# Patient Record
Sex: FEMALE | Race: WHITE | NOT HISPANIC OR LATINO | Employment: STUDENT | URBAN - METROPOLITAN AREA
[De-identification: names, ages, dates, MRNs, and addresses within clinical notes are randomized per-mention and may not be internally consistent; named-entity substitution may affect disease eponyms.]

---

## 2019-07-24 ENCOUNTER — OFFICE VISIT (OUTPATIENT)
Dept: FAMILY MEDICINE CLINIC | Facility: CLINIC | Age: 19
End: 2019-07-24

## 2019-07-24 VITALS
HEIGHT: 64 IN | OXYGEN SATURATION: 100 % | BODY MASS INDEX: 18.74 KG/M2 | WEIGHT: 109.8 LBS | TEMPERATURE: 98.1 F | DIASTOLIC BLOOD PRESSURE: 69 MMHG | SYSTOLIC BLOOD PRESSURE: 102 MMHG | HEART RATE: 50 BPM

## 2019-07-24 DIAGNOSIS — J30.2 SEASONAL ALLERGIC RHINITIS, UNSPECIFIED TRIGGER: Primary | ICD-10-CM

## 2019-07-24 DIAGNOSIS — R63.4 WEIGHT LOSS: ICD-10-CM

## 2019-07-24 DIAGNOSIS — F40.10 SOCIAL ANXIETY DISORDER: ICD-10-CM

## 2019-07-24 PROBLEM — F32.A DEPRESSION: Status: ACTIVE | Noted: 2019-07-24

## 2019-07-24 PROBLEM — R53.83 OTHER FATIGUE: Status: ACTIVE | Noted: 2019-07-24

## 2019-07-24 PROBLEM — J30.9 ALLERGIC RHINITIS: Status: ACTIVE | Noted: 2019-07-24

## 2019-07-24 PROBLEM — Z00.00 ENCOUNTER FOR PREVENTIVE HEALTH EXAMINATION: Status: ACTIVE | Noted: 2019-07-24

## 2019-07-24 PROBLEM — R60.1 GENERALIZED EDEMA: Status: ACTIVE | Noted: 2019-07-24

## 2019-07-24 PROBLEM — R22.0 FACIAL SWELLING: Status: ACTIVE | Noted: 2019-07-24

## 2019-07-24 PROCEDURE — 99214 OFFICE O/P EST MOD 30 MIN: CPT | Performed by: FAMILY MEDICINE

## 2019-07-24 RX ORDER — DULOXETIN HYDROCHLORIDE 60 MG/1
60 CAPSULE, DELAYED RELEASE ORAL DAILY
COMMUNITY
End: 2019-07-24

## 2019-07-24 RX ORDER — CETIRIZINE HYDROCHLORIDE 10 MG/1
10 TABLET ORAL DAILY
COMMUNITY

## 2019-07-24 RX ORDER — FLUTICASONE PROPIONATE 50 MCG
2 SPRAY, SUSPENSION (ML) NASAL DAILY
COMMUNITY

## 2019-07-24 NOTE — PROGRESS NOTES
Chief Complaint   Patient presents with   • Allergic Rhinitis   • Diabetes       Subjective   Kelley Singh is a 18 y.o. female.     History of Present Illness     F/u AR. Doing well with meds.  No SE.    F/U social anxiety D/O .  Doing well with anxiety.  I am running 4-10 miles a day 5 days a week.  Per patient,  Weight loss with orange theory and endurance training.  Concern on labs, per mom.  Has had a slow gradual wt loss per patient.         The following portions of the patient's history were reviewed and updated as appropriate: allergies, current medications, past family history, past medical history, past social history, past surgical history and problem list.    Review of Systems   Constitutional: Negative for activity change, appetite change, fatigue and unexpected weight gain.   Respiratory: Negative for cough and shortness of breath.    Cardiovascular: Negative for chest pain and leg swelling.       Patient Active Problem List   Diagnosis   • Allergic rhinitis   • Depression   • Generalized edema   • Facial swelling   • Other fatigue   • Encounter for preventive health examination   • Social anxiety disorder   • Weight loss       Allergies   Allergen Reactions   • Cefzil [Cefprozil] Other (See Comments)     .         Current Outpatient Medications:   •  cetirizine (zyrTEC) 10 MG tablet, Take 10 mg by mouth Daily., Disp: , Rfl:   •  fluticasone (EQL FLUTICASONE CHILDRENS) 50 MCG/ACT nasal spray, 2 sprays into the nostril(s) as directed by provider Daily., Disp: , Rfl:     Past Medical History:   Diagnosis Date   • Allergic        Past Surgical History:   Procedure Laterality Date   • ADENOIDECTOMY     • TONSILLECTOMY         History reviewed. No pertinent family history.    Social History     Tobacco Use   • Smoking status: Never Smoker   Substance Use Topics   • Alcohol use: Yes     Comment: socially             Objective     Vitals:    07/24/19 1454   BP: 102/69   Pulse: 50   Temp: 98.1 °F (36.7  °C)   SpO2: 100%     Body mass index is 18.85 kg/m².    Physical Exam   Constitutional: She appears well-developed and well-nourished.   Cardiovascular: Normal rate, regular rhythm and normal heart sounds. Exam reveals no gallop and no friction rub.   No murmur heard.  Pulmonary/Chest: Effort normal and breath sounds normal. No respiratory distress. She has no wheezes. She has no rales.   Vitals reviewed.      No results found for: GLUCOSE, BUN, CREATININE, EGFRIFNONA, EGFRIFAFRI, BCR, K, CO2, CALCIUM, PROTENTOTREF, ALBUMIN, LABIL2, AST, ALT    No results found for: WBC, RBC, HGB, HCT, MCV, MCH, MCHC, RDW, RDWSD, MPV, PLT, NEUTRORELPCT, LYMPHORELPCT, MONORELPCT, EOSRELPCT, BASORELPCT, AUTOIGPER, NEUTROABS, LYMPHSABS, MONOSABS, EOSABS, BASOSABS, AUTOIGNUM, NRBC    No results found for: HGBA1C    No results found for: [id number]    No results found for: TSH    No results found for: CHOL  No results found for: TRIG  No results found for: HDL  No results found for: LDL  No results found for: VLDL  No results found for: LDLHDL      Procedures    Assessment/Plan   Problems Addressed this Visit        Respiratory    Allergic rhinitis - Primary       Other    Social anxiety disorder    Weight loss    Relevant Orders    CBC & Differential    Comprehensive Metabolic Panel    TSH Rfx On Abnormal To Free T4          Orders Placed This Encounter   Procedures   • Comprehensive Metabolic Panel   • TSH Rfx On Abnormal To Free T4   • CBC & Differential     Order Specific Question:   Manual Differential     Answer:   No       Current Outpatient Medications   Medication Sig Dispense Refill   • cetirizine (zyrTEC) 10 MG tablet Take 10 mg by mouth Daily.     • fluticasone (EQL FLUTICASONE CHILDRENS) 50 MCG/ACT nasal spray 2 sprays into the nostril(s) as directed by provider Daily.       No current facility-administered medications for this visit.        Kelley Singh had no medications administered during this visit.    Return in about  1 year (around 7/24/2020).    There are no Patient Instructions on file for this visit.

## 2019-07-25 PROBLEM — D50.8 IRON DEFICIENCY ANEMIA SECONDARY TO INADEQUATE DIETARY IRON INTAKE: Status: ACTIVE | Noted: 2019-07-25

## 2019-07-25 LAB
ALBUMIN SERPL-MCNC: 5.5 G/DL (ref 3.5–5.2)
ALBUMIN/GLOB SERPL: 3.1 G/DL
ALP SERPL-CCNC: 45 U/L (ref 39–117)
ALT SERPL-CCNC: 23 U/L (ref 1–33)
AST SERPL-CCNC: 18 U/L (ref 1–32)
BASOPHILS # BLD AUTO: 0.03 10*3/MM3 (ref 0–0.2)
BASOPHILS NFR BLD AUTO: 0.5 % (ref 0–1.5)
BILIRUB SERPL-MCNC: 0.3 MG/DL (ref 0.2–1.2)
BUN SERPL-MCNC: 11 MG/DL (ref 6–20)
BUN/CREAT SERPL: 12.1 (ref 7–25)
CALCIUM SERPL-MCNC: 10.1 MG/DL (ref 8.6–10.5)
CHLORIDE SERPL-SCNC: 99 MMOL/L (ref 98–107)
CO2 SERPL-SCNC: 28.4 MMOL/L (ref 22–29)
CREAT SERPL-MCNC: 0.91 MG/DL (ref 0.57–1)
EOSINOPHIL # BLD AUTO: 0.03 10*3/MM3 (ref 0–0.4)
EOSINOPHIL NFR BLD AUTO: 0.5 % (ref 0.3–6.2)
ERYTHROCYTE [DISTWIDTH] IN BLOOD BY AUTOMATED COUNT: 13.6 % (ref 12.3–15.4)
GLOBULIN SER CALC-MCNC: 1.8 GM/DL
GLUCOSE SERPL-MCNC: 67 MG/DL (ref 65–99)
HCT VFR BLD AUTO: 33.7 % (ref 34–46.6)
HGB BLD-MCNC: 10.5 G/DL (ref 12–15.9)
IMM GRANULOCYTES # BLD AUTO: 0.01 10*3/MM3 (ref 0–0.05)
IMM GRANULOCYTES NFR BLD AUTO: 0.2 % (ref 0–0.5)
LYMPHOCYTES # BLD AUTO: 3.02 10*3/MM3 (ref 0.7–3.1)
LYMPHOCYTES NFR BLD AUTO: 49.2 % (ref 19.6–45.3)
MCH RBC QN AUTO: 31.1 PG (ref 26.6–33)
MCHC RBC AUTO-ENTMCNC: 31.2 G/DL (ref 31.5–35.7)
MCV RBC AUTO: 99.7 FL (ref 79–97)
MONOCYTES # BLD AUTO: 0.37 10*3/MM3 (ref 0.1–0.9)
MONOCYTES NFR BLD AUTO: 6 % (ref 5–12)
NEUTROPHILS # BLD AUTO: 2.68 10*3/MM3 (ref 1.7–7)
NEUTROPHILS NFR BLD AUTO: 43.6 % (ref 42.7–76)
NRBC BLD AUTO-RTO: 0 /100 WBC (ref 0–0.2)
PLATELET # BLD AUTO: 209 10*3/MM3 (ref 140–450)
POTASSIUM SERPL-SCNC: 4.7 MMOL/L (ref 3.5–5.2)
PROT SERPL-MCNC: 7.3 G/DL (ref 6–8.5)
RBC # BLD AUTO: 3.38 10*6/MM3 (ref 3.77–5.28)
SODIUM SERPL-SCNC: 140 MMOL/L (ref 136–145)
TSH SERPL DL<=0.005 MIU/L-ACNC: 1.61 MIU/ML (ref 0.27–4.2)
WBC # BLD AUTO: 6.14 10*3/MM3 (ref 3.4–10.8)

## 2019-07-29 ENCOUNTER — TELEPHONE (OUTPATIENT)
Dept: FAMILY MEDICINE CLINIC | Facility: CLINIC | Age: 19
End: 2019-07-29

## 2019-07-29 NOTE — TELEPHONE ENCOUNTER
Pt called and stated that her dermatologist wants to start her on spironolactone 10mg for cystic acne, her mother wants to make sure this is okay for her to take and wont cause her to lose weight etc. Please advise

## 2019-11-27 ENCOUNTER — OFFICE VISIT (OUTPATIENT)
Dept: FAMILY MEDICINE CLINIC | Facility: CLINIC | Age: 19
End: 2019-11-27

## 2019-11-27 VITALS
OXYGEN SATURATION: 95 % | TEMPERATURE: 98 F | DIASTOLIC BLOOD PRESSURE: 68 MMHG | HEIGHT: 64 IN | WEIGHT: 132.8 LBS | HEART RATE: 87 BPM | SYSTOLIC BLOOD PRESSURE: 105 MMHG | BODY MASS INDEX: 22.67 KG/M2

## 2019-11-27 DIAGNOSIS — D50.8 IRON DEFICIENCY ANEMIA SECONDARY TO INADEQUATE DIETARY IRON INTAKE: ICD-10-CM

## 2019-11-27 DIAGNOSIS — K59.1 FUNCTIONAL DIARRHEA: Primary | ICD-10-CM

## 2019-11-27 DIAGNOSIS — R14.0 ABDOMINAL DISTENTION: ICD-10-CM

## 2019-11-27 DIAGNOSIS — J30.2 SEASONAL ALLERGIC RHINITIS, UNSPECIFIED TRIGGER: ICD-10-CM

## 2019-11-27 PROCEDURE — 99214 OFFICE O/P EST MOD 30 MIN: CPT | Performed by: FAMILY MEDICINE

## 2019-11-27 RX ORDER — SPIRONOLACTONE 100 MG/1
100 TABLET, FILM COATED ORAL DAILY
Refills: 10 | COMMUNITY
Start: 2019-10-28

## 2019-11-27 NOTE — PROGRESS NOTES
Chief Complaint   Patient presents with   • Allergies       Subjective   Kelley Singh is a 19 y.o. female.     History of Present Illness   FU AR.   Doing well with meds.     F/U social anxiety d/o.  I am doing well with my regular exercise.  I am eating more now.  Doing orange theory now.    C/o bloating with eating.  Even with a regular meal or a snack. Often trouble with diarrhea.  No constipation.  I have every day. No better or worse.   New problem.  NO help with food elimination.      F/U iron def anemia.  Hemoglobin 10.5 4 months ago. On flintstones vitamin one a day.  I do not have periods due to having an IUD.      The following portions of the patient's history were reviewed and updated as appropriate: allergies, current medications, past family history, past medical history, past social history, past surgical history and problem list.    Review of Systems   Constitutional: Negative for appetite change and fatigue.   HENT: Negative for nosebleeds and sore throat.    Eyes: Negative for blurred vision and visual disturbance.   Respiratory: Negative for shortness of breath and wheezing.    Cardiovascular: Negative for chest pain and leg swelling.   Gastrointestinal: Positive for abdominal pain and diarrhea. Negative for abdominal distention and vomiting.   Endocrine: Negative for cold intolerance and polyuria.   Genitourinary: Negative for dysuria and hematuria.   Musculoskeletal: Negative for arthralgias and myalgias.   Skin: Negative for color change and rash.   Neurological: Negative for weakness and confusion.   Psychiatric/Behavioral: Negative for agitation and depressed mood.       Patient Active Problem List   Diagnosis   • Allergic rhinitis   • Depression   • Generalized edema   • Facial swelling   • Other fatigue   • Encounter for preventive health examination   • Social anxiety disorder   • Weight loss   • Iron deficiency anemia secondary to inadequate dietary iron intake   • Functional  diarrhea   • Abdominal distention       Allergies   Allergen Reactions   • Cefzil [Cefprozil] Other (See Comments)     .         Current Outpatient Medications:   •  cetirizine (zyrTEC) 10 MG tablet, Take 10 mg by mouth Daily., Disp: , Rfl:   •  fluticasone (EQL FLUTICASONE CHILDRENS) 50 MCG/ACT nasal spray, 2 sprays into the nostril(s) as directed by provider Daily., Disp: , Rfl:   •  spironolactone (ALDACTONE) 100 MG tablet, Take 100 mg by mouth Daily., Disp: , Rfl: 10    Past Medical History:   Diagnosis Date   • Allergic    • Allergic rhinitis    • Depression    • Edema    • Encounter for preventive health examination    • Facial swelling    • Fatigue        Past Surgical History:   Procedure Laterality Date   • ADENOIDECTOMY     • TONSILLECTOMY         Family History   Family history unknown: Yes       Social History     Tobacco Use   • Smoking status: Never Smoker   • Smokeless tobacco: Never Used   Substance Use Topics   • Alcohol use: Yes     Comment: socially             Objective     Vitals:    11/27/19 0911   BP: 105/68   Pulse: 87   Temp: 98 °F (36.7 °C)   SpO2: 95%     Body mass index is 22.8 kg/m².    Physical Exam   Constitutional: She is oriented to person, place, and time. She appears well-developed and well-nourished.   HENT:   Head: Normocephalic and atraumatic.   Right Ear: External ear normal.   Left Ear: External ear normal.   Nose: Nose normal.   Mouth/Throat: Oropharynx is clear and moist.   Eyes: Conjunctivae and EOM are normal. Pupils are equal, round, and reactive to light.   Neck: Normal range of motion. Neck supple. No tracheal deviation present. No thyromegaly present.   Cardiovascular: Normal rate, regular rhythm and normal heart sounds. Exam reveals no gallop and no friction rub.   No murmur heard.  Pulmonary/Chest: Effort normal and breath sounds normal. No respiratory distress. She exhibits no tenderness.   Abdominal: Soft. Bowel sounds are normal. She exhibits no distension. There  is no tenderness.   Musculoskeletal: Normal range of motion. She exhibits no edema or tenderness.   Lymphadenopathy:     She has no cervical adenopathy.   Neurological: She is alert and oriented to person, place, and time. She displays normal reflexes. No cranial nerve deficit or sensory deficit. Coordination normal.   Skin: Skin is warm and dry.   Psychiatric: She has a normal mood and affect. Her behavior is normal. Judgment and thought content normal.   Nursing note and vitals reviewed.      Lab Results   Component Value Date    BUN 11 07/24/2019    CREATININE 0.91 07/24/2019    EGFRIFNONA 79 07/24/2019    EGFRIFAFRI 96 07/24/2019    BCR 12.1 07/24/2019    K 4.7 07/24/2019    CO2 28.4 07/24/2019    CALCIUM 10.1 07/24/2019    PROTENTOTREF 7.3 07/24/2019    ALBUMIN 5.50 (H) 07/24/2019    LABIL2 3.1 07/24/2019    AST 18 07/24/2019    ALT 23 07/24/2019       WBC   Date Value Ref Range Status   07/24/2019 6.14 3.40 - 10.80 10*3/mm3 Final     RBC   Date Value Ref Range Status   07/24/2019 3.38 (L) 3.77 - 5.28 10*6/mm3 Final     Hemoglobin   Date Value Ref Range Status   07/24/2019 10.5 (L) 12.0 - 15.9 g/dL Final     Hematocrit   Date Value Ref Range Status   07/24/2019 33.7 (L) 34.0 - 46.6 % Final     MCV   Date Value Ref Range Status   07/24/2019 99.7 (H) 79.0 - 97.0 fL Final     MCH   Date Value Ref Range Status   07/24/2019 31.1 26.6 - 33.0 pg Final     MCHC   Date Value Ref Range Status   07/24/2019 31.2 (L) 31.5 - 35.7 g/dL Final     RDW   Date Value Ref Range Status   07/24/2019 13.6 12.3 - 15.4 % Final     Platelets   Date Value Ref Range Status   07/24/2019 209 140 - 450 10*3/mm3 Final     Neutrophil Rel %   Date Value Ref Range Status   07/24/2019 43.6 42.7 - 76.0 % Final     Lymphocyte Rel %   Date Value Ref Range Status   07/24/2019 49.2 (H) 19.6 - 45.3 % Final     Monocyte Rel %   Date Value Ref Range Status   07/24/2019 6.0 5.0 - 12.0 % Final     Eosinophil Rel %   Date Value Ref Range Status   07/24/2019  0.5 0.3 - 6.2 % Final     Basophil Rel %   Date Value Ref Range Status   07/24/2019 0.5 0.0 - 1.5 % Final     Neutrophils Absolute   Date Value Ref Range Status   07/24/2019 2.68 1.70 - 7.00 10*3/mm3 Final     Lymphocytes Absolute   Date Value Ref Range Status   07/24/2019 3.02 0.70 - 3.10 10*3/mm3 Final     Monocytes Absolute   Date Value Ref Range Status   07/24/2019 0.37 0.10 - 0.90 10*3/mm3 Final     Eosinophils Absolute   Date Value Ref Range Status   07/24/2019 0.03 0.00 - 0.40 10*3/mm3 Final     Basophils Absolute   Date Value Ref Range Status   07/24/2019 0.03 0.00 - 0.20 10*3/mm3 Final     nRBC   Date Value Ref Range Status   07/24/2019 0.0 0.0 - 0.2 /100 WBC Final       No results found for: HGBA1C    No results found for: [id number]    TSH   Date Value Ref Range Status   07/24/2019 1.610 0.270 - 4.200 mIU/mL Final       No results found for: CHOL  No results found for: TRIG  No results found for: HDL  No results found for: LDL  No results found for: VLDL  No results found for: LDLHDL      Procedures    Assessment/Plan   Problems Addressed this Visit        Respiratory    Allergic rhinitis       Digestive    Functional diarrhea - Primary    Relevant Orders    Celiac Comprehensive Panel    CBC & Differential    Comprehensive Metabolic Panel    Amylase    Lipase    Ferritin    Iron    Ambulatory Referral to Gastroenterology       Hematopoietic and Hemostatic    Iron deficiency anemia secondary to inadequate dietary iron intake    Relevant Orders    Celiac Comprehensive Panel    CBC & Differential    Ferritin    Iron    Ambulatory Referral to Gastroenterology       Other    Abdominal distention    Relevant Orders    Celiac Comprehensive Panel    CBC & Differential    Comprehensive Metabolic Panel    Amylase    Lipase    Ferritin    Iron    Ambulatory Referral to Gastroenterology        Continue allergy meds.    Orders Placed This Encounter   Procedures   • Celiac Comprehensive Panel   • Comprehensive  Metabolic Panel   • Amylase   • Lipase   • Ferritin   • Iron   • Ambulatory Referral to Gastroenterology     Referral Priority:   Routine     Referral Type:   Consultation     Referral Reason:   Specialty Services Required     Referred to Provider:   Hitesh Sarabia MD     Requested Specialty:   Gastroenterology     Number of Visits Requested:   1   • CBC & Differential     Order Specific Question:   Manual Differential     Answer:   No       Current Outpatient Medications   Medication Sig Dispense Refill   • cetirizine (zyrTEC) 10 MG tablet Take 10 mg by mouth Daily.     • fluticasone (EQL FLUTICASONE CHILDRENS) 50 MCG/ACT nasal spray 2 sprays into the nostril(s) as directed by provider Daily.     • spironolactone (ALDACTONE) 100 MG tablet Take 100 mg by mouth Daily.  10     No current facility-administered medications for this visit.        Kelley Singh had no medications administered during this visit.    Return in about 2 months (around 1/27/2020).    There are no Patient Instructions on file for this visit.

## 2019-11-30 LAB
ALBUMIN SERPL-MCNC: 4.8 G/DL (ref 3.5–5.2)
ALBUMIN/GLOB SERPL: 2.2 G/DL
ALP SERPL-CCNC: 54 U/L (ref 39–117)
ALT SERPL-CCNC: 16 U/L (ref 1–33)
AMYLASE SERPL-CCNC: 46 U/L (ref 28–100)
AST SERPL-CCNC: 15 U/L (ref 1–32)
BASOPHILS # BLD AUTO: 0.05 10*3/MM3 (ref 0–0.2)
BASOPHILS NFR BLD AUTO: 0.7 % (ref 0–1.5)
BILIRUB SERPL-MCNC: 0.4 MG/DL (ref 0.2–1.2)
BUN SERPL-MCNC: 16 MG/DL (ref 6–20)
BUN/CREAT SERPL: 18 (ref 7–25)
CALCIUM SERPL-MCNC: 10.2 MG/DL (ref 8.6–10.5)
CHLORIDE SERPL-SCNC: 101 MMOL/L (ref 98–107)
CO2 SERPL-SCNC: 29.2 MMOL/L (ref 22–29)
CREAT SERPL-MCNC: 0.89 MG/DL (ref 0.57–1)
ENDOMYSIUM IGA SER QL: NEGATIVE
EOSINOPHIL # BLD AUTO: 0.03 10*3/MM3 (ref 0–0.4)
EOSINOPHIL NFR BLD AUTO: 0.4 % (ref 0.3–6.2)
ERYTHROCYTE [DISTWIDTH] IN BLOOD BY AUTOMATED COUNT: 12.2 % (ref 12.3–15.4)
FERRITIN SERPL-MCNC: 18.9 NG/ML (ref 13–150)
GLIADIN PEPTIDE IGA SER-ACNC: 9 UNITS (ref 0–19)
GLIADIN PEPTIDE IGG SER-ACNC: 2 UNITS (ref 0–19)
GLOBULIN SER CALC-MCNC: 2.2 GM/DL
GLUCOSE SERPL-MCNC: 84 MG/DL (ref 65–99)
HCT VFR BLD AUTO: 35.5 % (ref 34–46.6)
HGB BLD-MCNC: 11.8 G/DL (ref 12–15.9)
IGA SERPL-MCNC: 151 MG/DL (ref 87–352)
IMM GRANULOCYTES # BLD AUTO: 0.02 10*3/MM3 (ref 0–0.05)
IMM GRANULOCYTES NFR BLD AUTO: 0.3 % (ref 0–0.5)
IRON SERPL-MCNC: 246 MCG/DL (ref 37–145)
LIPASE SERPL-CCNC: 47 U/L (ref 13–60)
LYMPHOCYTES # BLD AUTO: 2.12 10*3/MM3 (ref 0.7–3.1)
LYMPHOCYTES NFR BLD AUTO: 29.9 % (ref 19.6–45.3)
MCH RBC QN AUTO: 32.3 PG (ref 26.6–33)
MCHC RBC AUTO-ENTMCNC: 33.2 G/DL (ref 31.5–35.7)
MCV RBC AUTO: 97.3 FL (ref 79–97)
MONOCYTES # BLD AUTO: 0.39 10*3/MM3 (ref 0.1–0.9)
MONOCYTES NFR BLD AUTO: 5.5 % (ref 5–12)
NEUTROPHILS # BLD AUTO: 4.49 10*3/MM3 (ref 1.7–7)
NEUTROPHILS NFR BLD AUTO: 63.2 % (ref 42.7–76)
NRBC BLD AUTO-RTO: 0 /100 WBC (ref 0–0.2)
PLATELET # BLD AUTO: 229 10*3/MM3 (ref 140–450)
POTASSIUM SERPL-SCNC: 4.6 MMOL/L (ref 3.5–5.2)
PROT SERPL-MCNC: 7 G/DL (ref 6–8.5)
RBC # BLD AUTO: 3.65 10*6/MM3 (ref 3.77–5.28)
SODIUM SERPL-SCNC: 141 MMOL/L (ref 136–145)
TTG IGA SER-ACNC: <2 U/ML (ref 0–3)
TTG IGG SER-ACNC: 3 U/ML (ref 0–5)
WBC # BLD AUTO: 7.1 10*3/MM3 (ref 3.4–10.8)

## 2020-08-13 ENCOUNTER — OFFICE VISIT (OUTPATIENT)
Dept: FAMILY MEDICINE CLINIC | Facility: CLINIC | Age: 20
End: 2020-08-13

## 2020-08-13 VITALS
HEART RATE: 85 BPM | HEIGHT: 64 IN | BODY MASS INDEX: 18.1 KG/M2 | DIASTOLIC BLOOD PRESSURE: 69 MMHG | OXYGEN SATURATION: 96 % | SYSTOLIC BLOOD PRESSURE: 100 MMHG | TEMPERATURE: 97.1 F | WEIGHT: 106 LBS

## 2020-08-13 DIAGNOSIS — Z00.00 ENCOUNTER FOR PREVENTIVE HEALTH EXAMINATION: Primary | ICD-10-CM

## 2020-08-13 DIAGNOSIS — D50.8 IRON DEFICIENCY ANEMIA SECONDARY TO INADEQUATE DIETARY IRON INTAKE: ICD-10-CM

## 2020-08-13 DIAGNOSIS — Z79.899 HIGH RISK MEDICATION USE: ICD-10-CM

## 2020-08-13 PROBLEM — L70.0 ACNE VULGARIS: Status: ACTIVE | Noted: 2020-08-13

## 2020-08-13 PROCEDURE — 99395 PREV VISIT EST AGE 18-39: CPT | Performed by: FAMILY MEDICINE

## 2020-08-13 NOTE — PROGRESS NOTES
Patient here for annual physical exam    Subjective   Kelley Singh is a 19 y.o. female.     History of Present Illness   19 year old wF here for annual.    The following portions of the patient's history were reviewed and updated as appropriate: allergies, current medications, past family history, past medical history, past social history, past surgical history and problem list  Dentist:  UTD  Last colonoscopy: NA  Optometry: UTD  Last PSA(if applicable):NA  Last mammo(if applicable):NA  Exercise regulalry.       Immunization History   Administered Date(s) Administered   • Flu Vaccine Intradermal Quad 18-64YR 09/15/2019       Review of Systems   Constitutional: Negative for appetite change and fatigue.   HENT: Negative for nosebleeds and sore throat.    Eyes: Negative for blurred vision and visual disturbance.   Respiratory: Negative for shortness of breath and wheezing.    Cardiovascular: Negative for chest pain and leg swelling.   Gastrointestinal: Negative for abdominal distention and abdominal pain.   Endocrine: Negative for cold intolerance and polyuria.   Genitourinary: Negative for dysuria and hematuria.   Musculoskeletal: Negative for arthralgias and myalgias.   Skin: Negative for color change and rash.   Neurological: Negative for weakness and confusion.   Psychiatric/Behavioral: Negative for agitation and depressed mood.       Patient Active Problem List   Diagnosis   • Allergic rhinitis   • Depression   • Generalized edema   • Facial swelling   • Other fatigue   • Encounter for preventive health examination   • Social anxiety disorder   • Weight loss   • Iron deficiency anemia secondary to inadequate dietary iron intake   • Functional diarrhea   • Abdominal distention   • Acne vulgaris   • High risk medication use       Allergies   Allergen Reactions   • Cefzil [Cefprozil] Other (See Comments)     .         Current Outpatient Medications:   •  cetirizine (zyrTEC) 10 MG tablet, Take 10 mg by mouth  Daily., Disp: , Rfl:   •  fluticasone (EQL FLUTICASONE CHILDRENS) 50 MCG/ACT nasal spray, 2 sprays into the nostril(s) as directed by provider Daily., Disp: , Rfl:   •  spironolactone (ALDACTONE) 100 MG tablet, Take 100 mg by mouth Daily., Disp: , Rfl: 10    Past Medical History:   Diagnosis Date   • Allergic    • Allergic rhinitis    • Depression    • Edema    • Encounter for preventive health examination    • Facial swelling    • Fatigue        Past Surgical History:   Procedure Laterality Date   • ADENOIDECTOMY     • TONSILLECTOMY         Family History   Family history unknown: Yes       Social History     Tobacco Use   • Smoking status: Never Smoker   • Smokeless tobacco: Never Used   Substance Use Topics   • Alcohol use: Yes     Comment: socially             Objective     Vitals:    08/13/20 1114   BP: 100/69   Pulse: 85   Temp: 97.1 °F (36.2 °C)   SpO2: 96%     Body mass index is 18.19 kg/m².    Physical Exam   Constitutional: She appears well-developed and well-nourished.   HENT:   Head: Normocephalic and atraumatic.   Mouth/Throat: Oropharynx is clear and moist.   Eyes: Pupils are equal, round, and reactive to light. No scleral icterus.   Neck: No thyromegaly present.   Cardiovascular: Normal rate and regular rhythm. Exam reveals no gallop and no friction rub.   No murmur heard.  Pulmonary/Chest: Effort normal. No respiratory distress. She has no wheezes. She has no rales. She exhibits no tenderness.   Abdominal: Soft. Bowel sounds are normal. She exhibits no distension. There is no tenderness.   Musculoskeletal: Normal range of motion. She exhibits no edema or deformity.   Lymphadenopathy:     She has no cervical adenopathy.   Neurological: No cranial nerve deficit. She exhibits normal muscle tone.   Skin: Skin is warm and dry. No rash noted. She is not diaphoretic.   Vitals reviewed.      Lab Results   Component Value Date    BUN 16 11/27/2019    CREATININE 0.89 11/27/2019    EGFRIFNONA 80 11/27/2019     EGFRIFAFRI 97 11/27/2019    BCR 18.0 11/27/2019    K 4.6 11/27/2019    CO2 29.2 (H) 11/27/2019    CALCIUM 10.2 11/27/2019    PROTENTOTREF 7.0 11/27/2019    ALBUMIN 4.80 11/27/2019    LABIL2 2.2 11/27/2019    AST 15 11/27/2019    ALT 16 11/27/2019       WBC   Date Value Ref Range Status   11/27/2019 7.10 3.40 - 10.80 10*3/mm3 Final     RBC   Date Value Ref Range Status   11/27/2019 3.65 (L) 3.77 - 5.28 10*6/mm3 Final     Hemoglobin   Date Value Ref Range Status   11/27/2019 11.8 (L) 12.0 - 15.9 g/dL Final     Hematocrit   Date Value Ref Range Status   11/27/2019 35.5 34.0 - 46.6 % Final     MCV   Date Value Ref Range Status   11/27/2019 97.3 (H) 79.0 - 97.0 fL Final     MCH   Date Value Ref Range Status   11/27/2019 32.3 26.6 - 33.0 pg Final     MCHC   Date Value Ref Range Status   11/27/2019 33.2 31.5 - 35.7 g/dL Final     RDW   Date Value Ref Range Status   11/27/2019 12.2 (L) 12.3 - 15.4 % Final     Platelets   Date Value Ref Range Status   11/27/2019 229 140 - 450 10*3/mm3 Final     Neutrophil Rel %   Date Value Ref Range Status   11/27/2019 63.2 42.7 - 76.0 % Final     Lymphocyte Rel %   Date Value Ref Range Status   11/27/2019 29.9 19.6 - 45.3 % Final     Monocyte Rel %   Date Value Ref Range Status   11/27/2019 5.5 5.0 - 12.0 % Final     Eosinophil Rel %   Date Value Ref Range Status   11/27/2019 0.4 0.3 - 6.2 % Final     Basophil Rel %   Date Value Ref Range Status   11/27/2019 0.7 0.0 - 1.5 % Final     Neutrophils Absolute   Date Value Ref Range Status   11/27/2019 4.49 1.70 - 7.00 10*3/mm3 Final     Lymphocytes Absolute   Date Value Ref Range Status   11/27/2019 2.12 0.70 - 3.10 10*3/mm3 Final     Monocytes Absolute   Date Value Ref Range Status   11/27/2019 0.39 0.10 - 0.90 10*3/mm3 Final     Eosinophils Absolute   Date Value Ref Range Status   11/27/2019 0.03 0.00 - 0.40 10*3/mm3 Final     Basophils Absolute   Date Value Ref Range Status   11/27/2019 0.05 0.00 - 0.20 10*3/mm3 Final     nRBC   Date Value  Ref Range Status   11/27/2019 0.0 0.0 - 0.2 /100 WBC Final       No results found for: HGBA1C    No results found for: [id number]    TSH   Date Value Ref Range Status   07/24/2019 1.610 0.270 - 4.200 mIU/mL Final       No results found for: CHOL  No results found for: TRIG  No results found for: HDL  No results found for: LDL  No results found for: VLDL  No results found for: LDLHDL      Procedures    Assessment/Plan   Problems Addressed this Visit        Hematopoietic and Hemostatic    Iron deficiency anemia secondary to inadequate dietary iron intake    Relevant Orders    CBC & Differential    Ferritin    Iron       Other    Encounter for preventive health examination - Primary    High risk medication use    Relevant Orders    Basic Metabolic Panel    Lipid Panel With / Chol / HDL Ratio        Preventive care:    Continue regular exercise.  IUTD  Orders Placed This Encounter   Procedures   • Ferritin   • Iron   • Basic Metabolic Panel   • Lipid Panel With / Chol / HDL Ratio   • CBC & Differential     Order Specific Question:   Manual Differential     Answer:   No       Current Outpatient Medications   Medication Sig Dispense Refill   • cetirizine (zyrTEC) 10 MG tablet Take 10 mg by mouth Daily.     • fluticasone (EQL FLUTICASONE CHILDRENS) 50 MCG/ACT nasal spray 2 sprays into the nostril(s) as directed by provider Daily.     • spironolactone (ALDACTONE) 100 MG tablet Take 100 mg by mouth Daily.  10     No current facility-administered medications for this visit.        Return in about 1 year (around 8/13/2021).    There are no Patient Instructions on file for this visit.

## 2020-08-14 LAB
BASOPHILS # BLD AUTO: 0.05 10*3/MM3 (ref 0–0.2)
BASOPHILS NFR BLD AUTO: 0.8 % (ref 0–1.5)
BUN SERPL-MCNC: 11 MG/DL (ref 6–20)
BUN/CREAT SERPL: 11.8 (ref 7–25)
CALCIUM SERPL-MCNC: 10.1 MG/DL (ref 8.6–10.5)
CHLORIDE SERPL-SCNC: 102 MMOL/L (ref 98–107)
CHOLEST SERPL-MCNC: 161 MG/DL (ref 0–200)
CHOLEST/HDLC SERPL: 2.18 {RATIO}
CO2 SERPL-SCNC: 26.8 MMOL/L (ref 22–29)
CREAT SERPL-MCNC: 0.93 MG/DL (ref 0.57–1)
EOSINOPHIL # BLD AUTO: 0.04 10*3/MM3 (ref 0–0.4)
EOSINOPHIL NFR BLD AUTO: 0.6 % (ref 0.3–6.2)
ERYTHROCYTE [DISTWIDTH] IN BLOOD BY AUTOMATED COUNT: 14.5 % (ref 12.3–15.4)
FERRITIN SERPL-MCNC: 23.5 NG/ML (ref 13–150)
GLUCOSE SERPL-MCNC: 83 MG/DL (ref 65–99)
HCT VFR BLD AUTO: 35.6 % (ref 34–46.6)
HDLC SERPL-MCNC: 74 MG/DL (ref 40–60)
HGB BLD-MCNC: 11.6 G/DL (ref 12–15.9)
IMM GRANULOCYTES # BLD AUTO: 0.01 10*3/MM3 (ref 0–0.05)
IMM GRANULOCYTES NFR BLD AUTO: 0.2 % (ref 0–0.5)
IRON SERPL-MCNC: 57 MCG/DL (ref 37–145)
LDLC SERPL CALC-MCNC: 77 MG/DL (ref 0–100)
LYMPHOCYTES # BLD AUTO: 1.74 10*3/MM3 (ref 0.7–3.1)
LYMPHOCYTES NFR BLD AUTO: 27.5 % (ref 19.6–45.3)
MCH RBC QN AUTO: 33.2 PG (ref 26.6–33)
MCHC RBC AUTO-ENTMCNC: 32.6 G/DL (ref 31.5–35.7)
MCV RBC AUTO: 102 FL (ref 79–97)
MONOCYTES # BLD AUTO: 0.32 10*3/MM3 (ref 0.1–0.9)
MONOCYTES NFR BLD AUTO: 5.1 % (ref 5–12)
NEUTROPHILS # BLD AUTO: 4.17 10*3/MM3 (ref 1.7–7)
NEUTROPHILS NFR BLD AUTO: 65.8 % (ref 42.7–76)
NRBC BLD AUTO-RTO: 0 /100 WBC (ref 0–0.2)
PLATELET # BLD AUTO: 191 10*3/MM3 (ref 140–450)
POTASSIUM SERPL-SCNC: 4.8 MMOL/L (ref 3.5–5.2)
RBC # BLD AUTO: 3.49 10*6/MM3 (ref 3.77–5.28)
SODIUM SERPL-SCNC: 140 MMOL/L (ref 136–145)
TRIGL SERPL-MCNC: 52 MG/DL (ref 0–150)
VLDLC SERPL CALC-MCNC: 10.4 MG/DL
WBC # BLD AUTO: 6.33 10*3/MM3 (ref 3.4–10.8)

## 2020-08-14 NOTE — PROGRESS NOTES
You are slightly anemic.  It looks like you are deficient in B12 or folate with your elevated MCV.  Take a multivitamin with B12 and folate(or folic acid) daily.  Stay on this daily.  Your iron levels are normal.   All other labs look great.  Stay on the same plan and see me in 1 year for repeat labs.  Thanks.